# Patient Record
Sex: MALE | Race: WHITE | NOT HISPANIC OR LATINO | Employment: FULL TIME | URBAN - METROPOLITAN AREA
[De-identification: names, ages, dates, MRNs, and addresses within clinical notes are randomized per-mention and may not be internally consistent; named-entity substitution may affect disease eponyms.]

---

## 2017-02-10 ENCOUNTER — HOSPITAL ENCOUNTER (EMERGENCY)
Facility: MEDICAL CENTER | Age: 37
End: 2017-02-10
Attending: EMERGENCY MEDICINE
Payer: OTHER MISCELLANEOUS

## 2017-02-10 VITALS
TEMPERATURE: 97.9 F | HEART RATE: 100 BPM | WEIGHT: 187.39 LBS | BODY MASS INDEX: 26.23 KG/M2 | SYSTOLIC BLOOD PRESSURE: 154 MMHG | HEIGHT: 71 IN | DIASTOLIC BLOOD PRESSURE: 86 MMHG | OXYGEN SATURATION: 97 % | RESPIRATION RATE: 16 BRPM

## 2017-02-10 DIAGNOSIS — L30.9 ECZEMA, UNSPECIFIED TYPE: ICD-10-CM

## 2017-02-10 PROCEDURE — 99284 EMERGENCY DEPT VISIT MOD MDM: CPT

## 2017-02-10 PROCEDURE — 96372 THER/PROPH/DIAG INJ SC/IM: CPT

## 2017-02-10 PROCEDURE — 700111 HCHG RX REV CODE 636 W/ 250 OVERRIDE (IP): Performed by: EMERGENCY MEDICINE

## 2017-02-10 RX ORDER — TRIAMCINOLONE ACETONIDE 40 MG/ML
60 INJECTION, SUSPENSION INTRA-ARTICULAR; INTRAMUSCULAR ONCE
Status: COMPLETED | OUTPATIENT
Start: 2017-02-10 | End: 2017-02-10

## 2017-02-10 RX ORDER — TRIAMCINOLONE ACETONIDE 1 MG/G
CREAM TOPICAL
Qty: 454 G | Refills: 0 | Status: SHIPPED | OUTPATIENT
Start: 2017-02-10

## 2017-02-10 RX ADMIN — TRIAMCINOLONE ACETONIDE 60 MG: 40 INJECTION, SUSPENSION INTRA-ARTICULAR; INTRAMUSCULAR at 18:59

## 2017-02-10 ASSESSMENT — ENCOUNTER SYMPTOMS
VOMITING: 0
CHILLS: 0
FEVER: 0
NAUSEA: 0

## 2017-02-10 ASSESSMENT — PAIN SCALES - GENERAL: PAINLEVEL_OUTOF10: 0

## 2017-02-10 NOTE — ED AVS SNAPSHOT
2/10/2017          Andre Guajardo  [address] [facility] [address]    Dear Andre:    [facility] wants to ensure your discharge home is safe and you or your loved ones have had all your questions answered regarding your care after you leave the hospital.    You may receive a telephone call within two days of your discharge.  This call is to make certain you understand your discharge instructions as well as ensure we provided you with the best care possible during your stay with us.     The call will only last approximately 3-5 minutes and will be done by a nurse.    Once again, we want to ensure your discharge home is safe and that you have a clear understanding of any next steps in your care.  If you have any questions or concerns, please do not hesitate to contact us, we are here for you.  Thank you for choosing [facility] for your healthcare needs.    Sincerely,    Fabricio Barrera    [facility]

## 2017-02-10 NOTE — ED AVS SNAPSHOT
Home Care Instructions                                                                                                                Andre Guajardo   MRN: [id number]    Department:  [facility], Emergency Dept   Date of Visit:  2/10/2017            [facility], Emergency Dept    [address]    [place] [address]    Phone:  [phone]      You were seen by     Priyank Odom M.D.      Your Diagnosis Was     Eczema, unspecified type     L30.9       Medication Information     Review all of your home medications and newly ordered medications with your primary doctor and/or pharmacist as soon as possible. Follow medication instructions as directed by your doctor and/or pharmacist.     Please keep your complete medication list with you and share with your physician. Update the information when medications are discontinued, doses are changed, or new medications (including over-the-counter products) are added; and carry medication information at all times in the event of emergency situations.               Medication List      START taking these medications        Instructions    triamcinolone acetonide 0.1 % Crea   Commonly known as:  KENALOG    Apply to non healing or itchy lesions tid for 7 days         ASK your doctor about these medications        Instructions    hydrocodone-acetaminophen 7.5-325 MG per tablet   Commonly known as:  NORCO    Take 1-2 Tabs by mouth every 8 hours as needed for Mild Pain.   Dose:  1-2 Tab       ibuprofen 800 MG Tabs   Commonly known as:  MOTRIN    Take 1 Tab by mouth every 8 hours as needed for Mild Pain.   Dose:  800 mg       rosuvastatin 10 MG Tabs   Commonly known as:  CRESTOR    Take 1 Tab by mouth every evening.   Dose:  10 mg                 Discharge Instructions       Eczema      An atypical form of eczema. We are treating with steroids. Dr. Rios would like you to call Monday for follow-up. Call his nurse Kristal at  [phone]    Hypertension on your visit. Please make a follow-up appointment with primary care. I am [facility] to [facility]    Eczema, also called atopic dermatitis, is a skin disorder that causes inflammation of the skin. It causes a red rash and dry, scaly skin. The skin becomes very itchy. Eczema is generally worse during the cooler winter months and often improves with the warmth of summer. Eczema usually starts showing signs in infancy. Some children outgrow eczema, but it may last through adulthood.   CAUSES   The exact cause of eczema is not known, but it appears to run in families. People with eczema often have a family history of eczema, allergies, asthma, or hay fever. Eczema is not contagious.  Flare-ups of the condition may be caused by:   · Contact with something you are sensitive or allergic to.    · Stress.  SIGNS AND SYMPTOMS  · Dry, scaly skin.    · Red, itchy rash.    · Itchiness. This may occur before the skin rash and may be very intense.    DIAGNOSIS   The diagnosis of eczema is usually made based on symptoms and medical history.  TREATMENT   Eczema cannot be cured, but symptoms usually can be controlled with treatment and other strategies. A treatment plan might include:  · Controlling the itching and scratching.    ¨ Use over-the-counter antihistamines as directed for itching. This is especially useful at night when the itching tends to be worse.    ¨ Use over-the-counter steroid creams as directed for itching.    ¨ Avoid scratching. Scratching makes the rash and itching worse. It may also result in a skin infection (impetigo) due to a break in the skin caused by scratching.    · Keeping the skin well moisturized with creams every day. This will seal in moisture and help prevent dryness. Lotions that contain alcohol and water should be avoided because they can dry the skin.    · Limiting exposure to things that you are sensitive or allergic to (allergens).    · Recognizing situations  that cause stress.    · Developing a plan to manage stress.    HOME CARE INSTRUCTIONS   · Only take over-the-counter or prescription medicines as directed by your health care provider.    · Do not use anything on the skin without checking with your health care provider.    · Keep baths or showers short (5 minutes) in warm (not hot) water. Use mild cleansers for bathing. These should be unscented. You may add nonperfumed bath oil to the bath water. It is best to avoid soap and bubble bath.    · Immediately after a bath or shower, when the skin is still damp, apply a moisturizing ointment to the entire body. This ointment should be a petroleum ointment. This will seal in moisture and help prevent dryness. The thicker the ointment, the better. These should be unscented.    · Keep fingernails cut short. Children with eczema may need to wear soft gloves or mittens at night after applying an ointment.    · Dress in clothes made of cotton or cotton blends. Dress lightly, because heat increases itching.    · A child with eczema should stay away from anyone with fever blisters or cold sores. The virus that causes fever blisters (herpes simplex) can cause a serious skin infection in children with eczema.  SEEK MEDICAL CARE IF:   · Your itching interferes with sleep.    · Your rash gets worse or is not better within 1 week after starting treatment.    · You see pus or soft yellow scabs in the rash area.    · You have a fever.    · You have a rash flare-up after contact with someone who has fever blisters.       This information is not intended to replace advice given to you by your health care provider. Make sure you discuss any questions you have with your health care provider.     Document Released: 12/15/2001 Document Revised: 10/08/2014 Document Reviewed: 07/21/2014  Elsevier Interactive Patient Education ©2016 Elsevier Inc.            Patient Information     Patient Information    Following emergency treatment: all  patient requiring follow-up care must return either to a private physician or a clinic if your condition worsens before you are able to obtain further medical attention, please return to the emergency room.     Billing Information    At [facility], we work to make the billing process streamlined for our patients.  Our Representatives are here to answer any questions you may have regarding your hospital bill.  If you have insurance coverage and have supplied your insurance information to us, we will submit a claim to your insurer on your behalf.  Should you have any questions regarding your bill, we can be reached online or by phone as follows:  Online: You are able pay your bills online or live chat with our representatives about any billing questions you may have. We are here to help Monday - Friday from 8:00am to 7:30pm and 9:00am - 12:00pm on Saturdays.  Please visit [email or web address]  for more information.   Phone:  [phone] or [phone]    Please note that your emergency physician, surgeon, pathologist, radiologist, anesthesiologist, and other specialists are not employed by [facility] and will therefore bill separately for their services.  Please contact them directly for any questions concerning their bills at the numbers below:     Emergency Physician Services:  [phone]  [facility]:  [phone]  [facility]:  [phone]  [facility]:  [phone]    1. Your final bill may vary from the amount quoted upon discharge if all procedures are not complete at that time, or if your doctor has additional procedures of which we are not aware. You will receive an additional bill if you return to the Emergency Department at [facility] for suture removal regardless of the facility of which the sutures were placed.     2. Please arrange for settlement of this account at the emergency registration.    3. All self-pay  accounts are due in full at the time of treatment.  If you are unable to meet this obligation then payment is expected within 4-5 days.     4. If you have had radiology studies (CT, X-ray, Ultrasound, MRI), you have received a preliminary result during your emergency department visit. Please contact the radiology department [phone] to receive a copy of your final result. Please discuss the Final result with your primary physician or with the follow up physician provided.     Crisis Hotline:  [address] Crisis Hotline:  6-717-AYYLTHJ or 1-794.930.5440  [place] Crisis Hotline:    1-162.151.6700 or 347-617-7520         ED Discharge Follow Up Questions    1. In order to provide you with very good care, we would like to follow up with a phone call in the next few days.  May we have your permission to contact you?     YES /  NO    2. What is the best phone number to call you? (       )_____-__________    3. What is the best time to call you?      Morning  /  Afternoon  /  Evening                   Patient Signature:  ____________________________________________________________    Date:  ____________________________________________________________

## 2017-02-10 NOTE — ED NOTES
Pt amb to triage.  Chief Complaint   Patient presents with   • Skin Lesion     red, itchy raised lesions on arms, shoulders, back, x2wks, no relief w/ lotrimin     Pt asked to wait in lobby. Advised of wait time and triage process. Advised to alert RN w/ any changes in condition. Thanked for patience.

## 2017-02-10 NOTE — ED AVS SNAPSHOT
Tbricks Access Code: [email or web address]  Expires: 3/12/2017  6:53 PM    Your email address is not on file at [employer].  Email Addresses are required for you to sign up for Tbricks, please contact [phone] to verify your personal information and to provide your email address prior to attempting to register for Tbricks.    Andre Guajardo  [address] [place] [facility] [address]    Tbricks  A secure, online tool to manage your health information     [employer]’s Tbricks® is a secure, online tool that connects you to your personalized health information from the privacy of your home -- day or night - making it very easy for you to manage your healthcare. Once the activation process is completed, you can even access your medical information using the Tbricks judy, which is available for free in the Apple Judy store or Google Play store.     To learn more about Tbricks, visit [email or web address]    There are two levels of access available (as shown below):   My Chart Features  [facility] Primary Care Doctor [facility]  Specialists [facility]  Urgent  Care Non-[facility] Primary Care Doctor   Email your healthcare team securely and privately 24/7 X X X    Manage appointments: schedule your next appointment; view details of past/upcoming appointments X      Request prescription refills. X      View recent personal medical records, including lab and immunizations X X X X   View health record, including health history, allergies, medications X X X X   Read reports about your outpatient visits, procedures, consult and ER notes X X X X   See your discharge summary, which is a recap of your hospital and/or ER visit that includes your diagnosis, lab results, and care plan X X  X     How to register for Tbricks:  Once your e-mail address has been verified, follow the following steps to sign up for Tbricks.     1. Go to  [email or web address]  2. Click on the Sign Up Now box, which takes you to the New Member Sign Up page. You will need  to provide the following information:  a. Enter your Gife[employer] Access Code exactly as it appears at the top of this page. (You will not need to use this code after you’ve completed the sign-up process. If you do not sign up before the expiration date, you must request a new code.)   b. Enter your date of birth.   c. Enter your home email address.   d. Click Submit, and follow the next screen’s instructions.  3. Create a Giferent ID. This will be your Giferent login ID and cannot be changed, so think of one that is secure and easy to remember.  4. Create a Giferent password. You can change your password at any time.  5. Enter your Password Reset Question and Answer. This can be used at a later time if you forget your password.   6. Enter your e-mail address. This allows you to receive e-mail notifications when new information is available in Giferent.  7. Click Sign Up. You can now view your health information.    For assistance activating your G[employer] account, call [phone]

## 2017-02-11 NOTE — ED PROVIDER NOTES
ED Provider Note    Scribed for Dr. Priyank Odom M.D. by Melodie Sanchez. 2/10/2017, 5:23 PM.    Primary care provider: Pcp Pt States None  Means of arrival: Private vehicle  History obtained from: Patient  History limited by: None    CHIEF COMPLAINT  Chief Complaint   Patient presents with   • Skin Lesion     red, itchy raised lesions on arms, shoulders, back, x2wks, no relief w/ lotrimin       HPI  Andre Guajardo is a 36 y.o. male who presents to the Emergency Department due to red, raised, pruritic lesions on his bilateral upper extremities, shoulders, and back onset 3 weeks ago. Per patient, he used lotrimin without relief. The patient denies pain and does not report any aggravating factors. Denies associated symptoms of fever, chills, nausea, or vomiting.     REVIEW OF SYSTEMS  Review of Systems   Constitutional: Negative for fever and chills.   Gastrointestinal: Negative for nausea and vomiting.   Skin:        Positive for red and pruritic rash on bilateral upper extremities, shoulders, and back.    E    PAST MEDICAL HISTORY    The patient has no chronic medical history.    SURGICAL HISTORY  No surgical history noted    SOCIAL HISTORY  Social History   Substance Use Topics   • Smoking status: Never Smoker    • Alcohol Use: Yes      History   Drug Use No       FAMILY HISTORY  No family history noted    CURRENT MEDICATIONS  No current facility-administered medications on file prior to encounter.     Current Outpatient Prescriptions on File Prior to Encounter   Medication Sig Dispense Refill   • hydrocodone-acetaminophen (NORCO) 7.5-325 MG per tablet Take 1-2 Tabs by mouth every 8 hours as needed for Mild Pain. 20 Each 0   • ibuprofen (MOTRIN) 800 MG TABS Take 1 Tab by mouth every 8 hours as needed for Mild Pain. 30 Each 0   • rosuvastatin (CRESTOR) 10 MG TABS Take 1 Tab by mouth every evening. 90 Each 3       ALLERGIES  No Known Allergies    PHYSICAL EXAM  VITAL SIGNS: /86 mmHg  Pulse 100   "Temp(Src) 36.6 °C (97.9 °F)  Resp 16  Ht 1.803 m (5' 11\")  Wt 85 kg (187 lb 6.3 oz)  BMI 26.15 kg/m2  SpO2 97%    Constitutional: Alert in no apparent distress.  HENT: Normocephalic, Bilateral external ears normal. Nose normal.   Eyes: Pupils are equal and reactive. Conjunctiva normal, non-icteric.   Thorax & Lungs: Easy unlabored respirations  Abdomen:  No gross signs of peritonitis, no pain with movement   Skin: Plaque-like lesions approximately 1-3 cm base scattered throughout the body.   Extremities:  No edema, No asymmetry  Neurologic: Alert, Grossly non-focal.   Psychiatric: Affect and Mood normal    COURSE & MEDICAL DECISION MAKING  Nursing notes, VS, PMSFHx reviewed in chart.    5:23 PM - Patient seen and examined at bedside. I took photos of his lesions and explained that I will consult with the dermatologist on call. Patient understands and agrees.     5:25 PM- Paged Dermatology.     5:31 PM I discussed the patient's case and the above findings with Dr. Rios (Dermatology) who recommended giving triamcinolone acetonide 60 mg IV and discharging him with kenalog cream. The patient can follow up in his office.     6:12 PM - Re-examined; The patient is resting in bed comfortably. I discussed plans for discharge with a prescription for kenalog cream. He was given a referral to Dr. Rios (Dermatology) and instructed to return to the ED if his symptoms worsen. Patient understands and agrees. His vitals prior to discharge are: /86 mmHg  Pulse 100  Temp(Src) 36.6 °C (97.9 °F)  Resp 16  Ht 1.803 m (5' 11\")  Wt 85 kg (187 lb 6.3 oz)  BMI 26.15 kg/m2  SpO2 97%      The patient will return for new or worsening symptoms and is stable at the time of discharge.    Patient has had high blood pressure while in the emergency department, felt likely secondary to medical condition. Counseled patient to monitor blood pressure at home and follow up with primary care physician.      DISPOSITION:  Patient " will be discharged home in stable condition.    FOLLOW UP:  No follow-up provider specified.    OUTPATIENT MEDICATIONS:  New Prescriptions    TRIAMCINOLONE ACETONIDE (KENALOG) 0.1 % CREAM    Apply to non healing or itchy lesions tid for 7 days       FINAL IMPRESSION  1. Eczema, unspecified type          I, Melodie Sanchez (Vicki), am scribing for, and in the presence of, Priyank Odom M.D..    Electronically signed by: Melodie Sanchez (Nancyibvicky), 2/10/2017    IPriyank M.D. personally performed the services described in this documentation, as scribed by Melodie Sanchez in my presence, and it is both accurate and complete.      The note accurately reflects work and decisions made by me.  Priyank Odom  2/10/2017  8:30 PM

## 2017-02-11 NOTE — ED NOTES
Pt discharged to home. Pt armband removed. Pt and family understand written and verbal d/c instructions.  Prescription given.  Pt to follow up with ER if symptoms worsen.  Pt verbalized understanding.

## 2017-02-11 NOTE — ED NOTES
Chief Complaint   Patient presents with   • Skin Lesion     red, itchy raised lesions on arms, shoulders, back, x2wks, no relief w/ lotrimin     Pt ambulatory to rm 77.  Pt reports noticing small red circular lesions on bilateral upper arms and across back X 3 weeks ago.  Denies pain however lesions do cause  Itching.  Chart up for ERP.

## 2017-07-02 NOTE — DISCHARGE INSTRUCTIONS
Eczema      An atypical form of eczema. We are treating with steroids. Dr. Rios would like you to call Monday for follow-up. Call his nurse Kristal at [phone]    Hypertension on your visit. Please make a follow-up appointment with primary care. I am referring [facility] to the [facility]    Eczema, also called atopic dermatitis, is a skin disorder that causes inflammation of the skin. It causes a red rash and dry, scaly skin. The skin becomes very itchy. Eczema is generally worse during the cooler winter months and often improves with the warmth of summer. Eczema usually starts showing signs in infancy. Some children outgrow eczema, but it may last through adulthood.   CAUSES   The exact cause of eczema is not known, but it appears to run in families. People with eczema often have a family history of eczema, allergies, asthma, or hay fever. Eczema is not contagious.  Flare-ups of the condition may be caused by:   · Contact with something you are sensitive or allergic to.    · Stress.  SIGNS AND SYMPTOMS  · Dry, scaly skin.    · Red, itchy rash.    · Itchiness. This may occur before the skin rash and may be very intense.    DIAGNOSIS   The diagnosis of eczema is usually made based on symptoms and medical history.  TREATMENT   Eczema cannot be cured, but symptoms usually can be controlled with treatment and other strategies. A treatment plan might include:  · Controlling the itching and scratching.    ¨ Use over-the-counter antihistamines as directed for itching. This is especially useful at night when the itching tends to be worse.    ¨ Use over-the-counter steroid creams as directed for itching.    ¨ Avoid scratching. Scratching makes the rash and itching worse. It may also result in a skin infection (impetigo) due to a break in the skin caused by scratching.    · Keeping the skin well moisturized with creams every day. This will seal in moisture and help prevent dryness. Lotions that contain alcohol and water  should be avoided because they can dry the skin.    · Limiting exposure to things that you are sensitive or allergic to (allergens).    · Recognizing situations that cause stress.    · Developing a plan to manage stress.    HOME CARE INSTRUCTIONS   · Only take over-the-counter or prescription medicines as directed by your health care provider.    · Do not use anything on the skin without checking with your health care provider.    · Keep baths or showers short (5 minutes) in warm (not hot) water. Use mild cleansers for bathing. These should be unscented. You may add nonperfumed bath oil to the bath water. It is best to avoid soap and bubble bath.    · Immediately after a bath or shower, when the skin is still damp, apply a moisturizing ointment to the entire body. This ointment should be a petroleum ointment. This will seal in moisture and help prevent dryness. The thicker the ointment, the better. These should be unscented.    · Keep fingernails cut short. Children with eczema may need to wear soft gloves or mittens at night after applying an ointment.    · Dress in clothes made of cotton or cotton blends. Dress lightly, because heat increases itching.    · A child with eczema should stay away from anyone with fever blisters or cold sores. The virus that causes fever blisters (herpes simplex) can cause a serious skin infection in children with eczema.  SEEK MEDICAL CARE IF:   · Your itching interferes with sleep.    · Your rash gets worse or is not better within 1 week after starting treatment.    · You see pus or soft yellow scabs in the rash area.    · You have a fever.    · You have a rash flare-up after contact with someone who has fever blisters.       This information is not intended to replace advice given to you by your health care provider. Make sure you discuss any questions you have with your health care provider.     Document Released: 12/15/2001 Document Revised: 10/08/2014 Document Reviewed:  07/21/2014  Elsevier Interactive Patient Education ©2016 Elsevier Inc.     Neuro